# Patient Record
(demographics unavailable — no encounter records)

---

## 2017-11-03 NOTE — NUR
MADE DR MORENO AWARE OF PATIENT'S BP /128. DR MORENO CALLED DR RODGERS AND INFORMED HER ABOUT PATIENT'S VITAL SIGNS AND HX. PATIENT OK TO BE 
ADMITTED

## 2017-11-03 NOTE — NUR
Patient will be admitted to care of DR RODRIGUEZ. Admited to [facility].  Will go to 
room 113. Belongings list completed.  Report to BJ CAICEDO.

## 2017-11-03 NOTE — NUR
ADMINISTERED SCHEDULED MEDICATIONS. PT BP /134 . PT HAS PRN NITROGLYCERIN 0.4 MG 
SL. WILL ADMINISTER. PT DENIES CHEST PAIN AND HEADACHE. WILL CONTINUE TO MONITOR.

## 2017-11-03 NOTE — NUR
PT BP /98 HR 89. PT DENIES CHEST PAIN AND SOB. PT IS AAOX4 AND SHOWS NO S/S OF ACUTE 
DISTRESS ON ROOM AIR. PT IS SITTING UP AND EATING DINNER TOLERATING DIET WELL. BED IN LOW 
POSITION WITH CALL LIGHT WITHIN REACH.

## 2017-11-03 NOTE — NUR
PATIENT HAS BEEN SCREENED AND CATEGORIZED AS MODERATE NUTRITION RISK. PATIENT WILL BE SEEN 
WITHIN 3-5 DAYS OF ADMISSION.



11/5/17-11/7/17



DEE CORREA RD

## 2017-11-03 NOTE — NUR
PT HAS ASTHMA, C/O SHALLOW BREATHING WITH AUDIBLE WHEEZING SOUNDS.  INHALER AT 
HOME INEFFECTIVE. 

PT DENIES N/V/D; AAOX4 WITH EVEN AND STEADY GAIT; HR EVEN AND REGULAR; PT 
DENIES ANY FEVER, CP AT THIS TIME; PATIENT STATES PAIN OF 8/10 AT THIS TIME; 
VSS; PATIENT POSITIONED FOR COMFORT; HOB ELEVATED; BEDRAILS UP X2; BED DOWN. ER 
MD MADE AWARE OF PT STATUS.

## 2017-11-03 NOTE — NUR
PT ARRIVED ON UNIT. PT IS AAOX4 AND SHOWS NO S/S OF ACUTE DISTRESS ON ROOM AIR. PT BP IS 
187/116 . ER NURSE STATES DR RODGERS AND DR WEILE ARE AWARE OF HIGH BLOOD PRESSURE. 
SKIN IS INTACT. PT DENIES PAIN. IV NOTED ON THE R AC WITH IVF'S INFUSING WELL. ON TELE 
MONITOR. PT IS AMB. PT WAS EXPLAINED POC FOR TODAY AND VERBALIZED UNDERSTANDING. WILL 
CONTINUE TO MONITOR.

## 2017-11-03 NOTE — NUR
RECEIVED REPORT FROM DAY NURSE, PT IN STABLE CONDITION. NO S/S OF DISTRESS NOTED. PT WARM 
AND DRY. AAOX4, ON RA. IV TO R AC 20G, INFUSING WELL, PATENT AND INTACT. SKIN INTACT. 
RESPIRATIONS ARE EVEN AND UNLABORED. BOWEL SOUNDS PRESENT. INITIAL ASSESSMENT COMPLETED, 
PLAN OF CARE DISCUSSED WITH PT AT THE BEDSIDE, VERBALIZED UNDERSTANDING. ALL SAFETY 
PRECAUTIONS MET, CALL LIGHT WITHIN REACH, WILL CONTINUE TO MONITOR

## 2017-11-03 NOTE — NUR
DR WEILE STATED HE PLACED IN ORDERS FOR PT HIGH BLOOD PRESSURE. WILL ADMINISTER WHEN 
MEDICATIONS ARE READY.

## 2017-11-03 NOTE — NUR
PT IS SLEEPING AND SHOWS NO S/S OF ACUTE DISTRESS ON ROOM AIR. THE BED IS IN LOW POSITION 
WITH CALL LIGHT WITHIN REACH. IV SL.

## 2017-11-04 NOTE — NUR
PATIENT LYING IN BED COMFORTABLY. NO DISTRESS. ALL NEEDS MET AT THIS TIME. CALL LIGHT WITHIN 
REACH.

## 2017-11-04 NOTE — NUR
PT RESTING COMFORTABLY IN BED, NO S/S OF DISTRESS NOTED. ALL SAFETY PRECAUTIONS MET, CALL 
LIGHT WITHIN REACH, WILL CONTINUE TO MONITOR

## 2017-11-04 NOTE — NUR
RECEIVED PT IN BED. AWAKE. EATING BREAKFAST. ALERT ORIENTED X4. NO SOB NOTED. DENIES ANY 
PAIN OR DISCOMFORT AT THIS TIME. PT AMBULATORY. SAFETY PRECAUTION IN PLACE. CALL LIGHT 
WITHIN REACH.

## 2017-11-04 NOTE — NUR
PT KEPT CLEAN, DRY AND COMFORTABLE, NEEDS ATTENDED. WILL ENDORSE TO NEXT SHIFT, PT ON STABLE 
CONDITION, FOR CONTINUITY OF CARE. NO SOB NOTED, DENIES ANY PAIN OR DISCOMFORT AT THIS TIME.

## 2017-11-04 NOTE — NUR
GAVE REPORT TO DAY NURSE AT THE BEDSIDE FOR CONTINUITY OF CARE. PT REMAINS STABLE. NO S/S OF 
DISTRESS NOTED.

## 2017-11-04 NOTE — NUR
RECEIVED A CALL FROM LEXI FROM LAB REGARDING TROPONIN ORDER, NO ORDER FOR TROPONIN #2 AT 
11/3/17 AT 2300 AND 11/4/17 AT 0700. DR. BRIDGES MADE AWARE AND TO PUT IN ORDER.

## 2017-11-04 NOTE — NUR
RECEIVED REPORT FROM DAY SHIFT NURSE. AAOX4. NO C/O PAIN. NO SOB NOTED. IV TO RIGHT AC, 
SALINE LOCK. 

CALL LIGHT WITHIN REACH. WILL CONTINUE TO MONITOR.

## 2017-11-05 NOTE — NUR
RECEIVED REPORT FROM DAY RN. PATIENT RESTING IN BED, NO S/S OF ACUTE DISTRESS NOTED, 
RESPIRATION EVEN AND UNLABORED, IV PATENT AND INTACT. PLAN OF CARE DISCUSSED, PATIENT 
VERBALIZED UNDERSTANDING. CALL LIGHT WITHIN REACH, SAFETY MEASURE ENSURED, WILL CONTINUE TO 
MONITOR.

## 2017-11-05 NOTE — NUR
PATIENT IS WATCHING TV. NO S/S OF ACUTE DISTRESS NOTED, RESPIRATION EVEN AND UNLABORED, CALL 
LIGHT WITHIN REACH, SAFETY MEASURE ENSURED, WILL CONTINUE TO MONITOR.

## 2017-11-05 NOTE — NUR
RECHECKED BLOOD PRESSURE, RECORDED. BLOOD PRESSURE WENT DOWN. PT ASYMPTOMATIC. AWAKE, ABLE 
TO MAKE NEEDS KNOWN. WILL MONITOR VITAL SIGNS.

## 2017-11-05 NOTE — NUR
DUE MEDICATION GIVEN, PATIENT TOLERATED WELL. NO S/S OF ACUTE DISTRESS NOTED, RESPIRATION 
EVEN AND UNLABORED, CALL LIGHT WITHIN REACH, SAFETY MEASURE ENSURED, WILL CONTINUE TO 
MONITOR.

## 2017-11-05 NOTE — NUR
VITAL SIGNS TAKEN AND RECORDED. BLOOD PRESSURE NOTED TO BE ELEVATED. WILL MEDICATE WITH BP 
MEDS AS SCHEDULED.

## 2017-11-05 NOTE — NUR
PT KEPT CLEAN, DRY, AND COMFORTABLE, NEEDS ATTENDED. NO SOB NOTED. DENIES ANY PAIN OR 
DISCOMFORT AT THIS TIME.  WILL ENDORSE TO NEXT SHIFT, PT ON STABLE CONDITION, FOR CONTINUITY 
OF CARE.

## 2017-11-05 NOTE — NUR
EDUCATED PATIENT ON LOW SODIUM DIET DUE TO HEART CONDITION. PATIENT VERBALIZED 
UNDERSTANDING. SAFETY MEASURE ENSURED, WILL CONTINUE TO MONITOR.

## 2017-11-05 NOTE — NUR
RECEIVED PT IN BED. AWAKE. ALERT ORIENTEDX4. NO SOB NOTED. DENIES ANY PAIN OR DISCOMFORT AT 
THIS TIME. PT AMBULATORY. SAFETY PRECAUTION IN PLACE. CALL LIGHT WITHIN REACH.

## 2017-11-06 NOTE — NUR
GAVE D/C INSTRUCTIONS AND FORMS TO PT. PT'S DAUGHTER IS AT BEDSIDE AND TRANSLATED 
INSTRUCTIONS, F/U APPOINTMENT, LABS AND RX FOR PT. ASKED IF PT HAD ANY OTHER QUESTIONS, PT 
STATED NO. PT SIGNED FORMS. IV CATHETER REMOVED FROM LEFT HAND 22G. IV CATHETER TIP INTACT. 
APPLIED DRESSING AND PRESSURE TO SITE. NO BLEEDING NOTED. REMOVED TELE MONITOR AND ID BANDS. 
PT STATED SHE DID NOT HAVE A T-SHIRT. CALLED SECURITY AND DELIVERED T-SHIRT FOR PT TO GO 
HOME IN. PT CHANGED IN OWN CLOTHES AND LEFT WITH ALL PERSONAL BELONGINGS. LEFT UNIT VIA 
AMBULATION ACCOMPANIED BY RN AND DAUGHTER. LEFT IN STABLE CONDITION TO GO HOME.

## 2017-11-06 NOTE — NUR
PT'S BP /110. ADMINISTERED NITROSTAT SL FOR BP. PT TOLERATED MEDS WELL. PT IS SITTING 
UP IN BED. DENIES PAIN. NO RESPIRATORY DISTRESS NOTED. BED IN LOW POSITION, WHEELS LOCKED. 
INSTRUCTED PT TO USE CALL LIGHT WHEN NEED OF ASSISTANCE AND PLACE CALL LIGHT WITHIN REACH. 
PT VERBALIZED UNDERSTANDING. WILL CONTINUE TO MONITOR.

## 2017-11-06 NOTE — NUR
RECEIVED REPORT FROM NIGHT SHIFT NURSE JESUS AT BEDSIDE FOR CONTINUITY OF CARE. PT IS AWAKE 
AND ORIENTED. INTRODUCED SELF AND UPDATED BOARD. PT IN STABLE CONDITION.

## 2017-11-06 NOTE — NUR
/98, HR 96. PATIENT DENIES ANY DISCOMFORT OR PAIN. CALL LIGHT WITHIN REACH, SAFETY 
MEASURE ENSURED, WILL CONTINUE TO MONITOR.

## 2017-11-06 NOTE — NUR
OFFERED PATIENT HOT TEA AS SHE REQUESTED. NO CHANGE IN CONDITION, RESPIRATION EVEN AND 
UNLABORED, CALL LIGHT WITHIN REACH, SAFETY MEASURE ENSURED, WILL CONTINUE TO MONITOR.

## 2017-11-06 NOTE — NUR
MADE DR. ALATORRE AWARE OF PATIENT'S /98, NO NEW ORDER RECEIVED AT THIS TIME. WILL 
CONTINUE TO MONITOR.

## 2018-04-02 NOTE — NUR
52 Y/O F W/C/O SOB x 2 wks GETTING worse last 3 days, LABORED BREATHING NOTED, 
WHEEZES BILATERAL, AND RONCHI. PRODUCTIVE COUGH NOTED, DIAPHORECTIC, DENIES 
CHEST PAIN, SINUS TACHY ON MONITOR. MED HX HTN. PT PLACED ON MONITOR. ER MD 
,MADE AWARE.

hx--asthma, cardiomegaly, htn, dm

rx---albuterol, lisinopril

## 2018-04-02 NOTE — NUR
PT RESTING IN BED, LABORED CREATHING NOTED. DIAPHORETIC, SINUS TACHY, DENIES 
ANY CHEST PAIN. ER MD INFORMED.

## 2018-04-03 NOTE — NUR
RECEIVED REPORT FROM NIGHT SHIFT RN. PATIENT IS ASLEEP, BUT AROUSABLE TO SHAKING AND NAME. 
NO SIGNS AND SYMPTOMS OF ACUTE DISTRESS NOTED AT THIS TIME. HAS NC AT 3 LPM. PATIENT HAS A 
FAIR CATHETER DRAINING TO GRAVITY. HAS IV TO THE RIGHT AC 20G ON SALINE LOCK AT THIS TIME. 
SITE IS CLEAN, DRY, PATENT AND INTACT. BED IS IN LOWEST POSITION, SIDE RAILS UP X2, CALL 
LIGHT WITHIN REACH. WILL CONTINUE TO MONITOR.

## 2018-04-03 NOTE — NUR
RECEIVED BEDSIDE REPORT FROM DAY SHIFT NURSE RUBINA RN, PT STABLE, NO DISTRESS NOTED, CALL 
LIGHT WITHIN REACH, IV TO RAC 20G RUNNING ROCEPHINE @ 100 ML/HR, PT ON 2LPM O2 VIA NC NO 
SOB, FAIR IN PLACE DRAINING YELLOW URINE, INITIAL ASSESSMENT DONE, ALL SAFETY PRECAUTION 
MET, WILL CONTINUE TO MONITOR.

## 2018-04-03 NOTE — NUR
PT C/O FEELING HUNGRY AND WANTS COFFEE, SANDWICH AND DECAF COFFEE GIVEN, PT ATE WELL, AFTER 
EATING PT WENT BACK TO SLEEP, NO DISTRESS  NOTED, CALL LIGHT WITHIN REACH, WILL CONTINUE TO 
MONITOR.

## 2018-04-03 NOTE — NUR
PT ARRIVED AT UNIT VIA GURNEY, REPORT RECEIVED FROM ED NURSE BUDDY RN, PT STABLE, NO DISTRESS 
NOTED, IV TO R AC 20G SL, PATENT, INTACT, PT ON 3LPM O2 VIA NC, NO SOB, SKIN INTACT ORIENT 
PT TO UNIT AND CALL LIGHT, PT STATED UNDERSTANDING, MRSA SWAB TAKEN, INITIAL ASSESSMENT 
DONE, ALL SAFETY PRECAUTION MET, WILL CONTINUE TO MONITOR.

## 2018-04-03 NOTE — NUR
FOUND PT ON 2L NC. TX WAS TOLERATED AND GIVEN VIA MASK. PT IS NOT IN ANY RESPIRATORY 
DISTRESS OR SOB. SP02 98%  RR 16. PLACED PT BACK ON 2L NC.

## 2018-04-03 NOTE — NUR
PATIENT HAS BEEN SCREENED AND CATEGORIZED AS HIGH NUTRITION RISK. PATIENT WILL BE SEEN 
WITHIN 1-2 DAYS OF ADMISSION.



4/3/18 - 4/4/18



DEE CORREA RD

## 2018-04-03 NOTE — NUR
ENDORSED PLAN OF CARE TO DAY SHIFT NURSE RUBINA RN, PT STABLE, NO DISTRESS NOTED, CALL 
LIGHT WITHIN REACH.

## 2018-04-03 NOTE — NUR
PT RESTING IN BED, STATES HAS NO MORE PAIN AT THE MOMENT. CONTINUES SINUS 
TACHY, IN O2, 99% SPO2. DENIES ANY CHEST PAIN. URINE OUTPUT 1400 ML AT THE 
MOMENT. NO OTHER S/S OF DISTRESS NOTED AT THE MOMENT. ER MD MOISE AWARE.

## 2018-04-03 NOTE — NUR
DUE MEDICATION GIVEN, PT TOLERATED WELL, NO DISTRESS NOTED, CALL LIGHT WITHIN REACH, WILL 
CONTINUE TO MONITOR.

## 2018-04-03 NOTE — NUR
Patient will be admitted to care of DR MAGALLON. Admited to TELEMETRY.  Will 
go to room 119A. Belongings list completed.  Report to BJ MCGRATH.

## 2018-04-04 NOTE — NUR
RECEIVED REPORT FROM NIGHT SHIFT RN. PATIENT IS AAOX4. NO SIGNS AND SYMPTOMS OF ACUTE 
DISTRESS NOTED AT THIS TIME. PATIENT IS ON ROOM AIR. PATIENT HAS A FAIR CATHETER DRAINING 
TO GRAVITY. HAS IV TO THE RIGHT AC 20G ON SALINE LOCK AT THIS TIME. SITE IS CLEAN, DRY, 
PATENT AND INTACT. BED IS IN LOWEST POSITION, SIDE RAILS UP X2, CALL LIGHT WITHIN REACH. 
WILL CONTINUE TO MONITOR.

## 2018-04-04 NOTE — NUR
SCHEDULED MEDICATIONS GIVEN. PT TOLERATED WELL. NO S/S OF RESPIRATORY DISTRESS OR DISCOMFORT 
NOTED AT THIS TIME. PT RESTING IN BED WATCHING TV. BED IN LOWEST POSITION. CALL LIGHT WITHIN 
REACH. WILL CONTINUE TO MONITOR.

## 2018-04-04 NOTE — NUR
CHECKED ON PT, PT TOOK OFF NC, PT ON ROOM AIR, NO SOB, CHECKED PT O2 SAT 95%, PT STATED DOES 
NOT WANT TO WEAR THE NC AT THIS MOMENT, PT WENT BACK TO SLEEP, NO DISTRESS NOTED, CALL LIGHT 
WITHIN REACH, WILL CONTINUE TO MONITOR.

## 2018-04-04 NOTE — NUR
PT C/O HEADACHE, TYLENOL GIVEN, PT TOLERATED WELL, NO DISTRESS NOTED, CALL LIGHT WITHIN 
REACH, WILL CONTINUE TO MONITOR.

## 2018-04-04 NOTE — NUR
PT SLEEPING IN BED. NO S/S OF RESPIRATORY DISTRESS OR DISCOMFORT NOTED AT THIS TIME. CALL 
LIGHT WITHIN REACH. WILL CONTINUE TO MONITOR.

## 2018-04-04 NOTE — NUR
RECEIVED PT FROM DAY SHIFT NURSE ALMA. AOX4 ON ROOM AIR. IV RIGHT WRIST #22G, SALINE 
LOCK, PATENT AND FLUSHING WELL. PT RESTING IN BED. FAMILY AT BEDSIDE. PT WATCHING TV. NO S/S 
OF RESPIRATORY DISTRESS OR DISCOMFORT NOTED AT THIS TIME. BED IN LOWEST POSITION. CALL LIGHT 
WITHIN REACH. WILL CONTINUE TO MONITOR.

## 2018-04-05 NOTE — NUR
ADMINISTERED SCHEDULED MEDICATIONS, PATIENT ALSO C/O HEADACHE AND WAS GIVEN TYLENOL 650 MG 
PO. WILL REASSESS PAIN IN ONE HR.

## 2018-04-05 NOTE — NUR
***** PHYSICAL THERAPY CO-SIGN *****

The Physical Therapy Progress Notes documented by Physical Therapy Assistant have been 
reviewed.

I CONCUR W/PTA NOTE

Reviewed/Co-Signed by: Roseann Araujo PT

Documentation Done by: JACINDA LEOS PTA

-------------------------------------------------------------------------------

Addendum: 04/05/18 at 1337 by Roseann Araujo PT

-------------------------------------------------------------------------------

Amended: Links added.

## 2018-04-05 NOTE — NUR
RECEIVED PATIENT REPORT AT BEDSIDE FROM NIGHT NURSE, PATIENT IS AAOX4 AND SHOWS NO S/S OF 
ACUTE DISTRESS ON ROOM AIR, SKIN IS INTACT; HOWEVER PATIENT HAS ROUGH DRY SKIN AT BLE, IV 
NOTED ON THER LEFT WRIST SL PATENT AND INTACT. FAIR CATHETER NOTED WITH YELLOW URINE 
DRAINING, PATIENT DENIES PAIN, DISCUSSED POC WITH PATIENT AND SHE VERBALIZED UNDERSTANDING 
OF CARE. BED IN LOW POSITION WITH CALL LIGHT WITHIN REACH.

## 2018-04-05 NOTE — NUR
PATIENT HAS BEEN DISCHARGED, ALL PAPERWORK SIGNED, DISCHARGE INSTRUCTIONS GIVEN AND 
PRESCRIPTIONS, ALL QUESTIONS ANSWERED, ALL BELONGINGS AND PRESCRIPTIONS IN PATIENT'S 
POSSESSION, IV WAS DISCONTINUED WITH CANNULA INTACT. PATIENT VERBALIZED UNDERSTANDING OF HER 
CONTINUITY OF CARE.  WRISTBANDS REMOVED. PATIENT WAS OFFERED WHEELCHAIR HOWEVER SHE 
PREFERRED TO AMB OFF UNIT. PATIENT AMB OFF UNIT WITH STEADY GAIT.

## 2018-04-05 NOTE — NUR
PATIENT'S BLOOD SUGAR , INSULIN SLIDING SCALE IS INDICATED; HOWEVER PATIENT REFUSED 
AND STATED, "NO LO USO EN LA CASA, ME VAN A SANDEE LORAINE HORITA. NO LO QUIERO." PATIENT REFUSED 
TO TAKE INDICATED INSULIN SLIDING SCALE PATIENT STATED SHE DOES NOT USE IT AT HOME, AND SHE 
WILL CONTINUE HER HOME MEDICATIONS ONCE SHE IS DISCHARGED TODAY.

## 2018-04-07 NOTE — NUR
52 yo female [facility] pd for prebook exam. c/o  SORE THROAT , COUGH & sob 
and hx of asthma and chf & HTN . awake and alert able to ambulate. PT STATED 
CAME HERE WITH  SAME S/S X 2 DAYS AGO  BUT DIDN'T TAKE  DISCHARGE MEDS.   
BRUISE TO RIGHT UPPER ARM. AAOX4 WITH EVEN AND STEADY GAIT; LUNGS WHEEZING  BL. 
PT DENIES ANY FEVER OR CP AT THIS TIME.  PATIENT STATES PAIN OF 2/10 AT THIS 
TIME

## 2018-09-20 NOTE — NUR
CHECKED ON PT. ADMINISTERED BP MEDS AS SCHEDULED. STARTED ROCEPHIN ON PT. TOLERATED WELL. 
INFORMED THAT PT IS ON FLUID RESTRICTION 1000ML/DAY R/T HER DX.  VERBALIZED UNDERSTANDING. 
FAMILY AT BEDSIDE. NO SIGN OF DISTRESS NOTED . RESPIRATION RATE 20, NO DISTRESS. WILL 
CONTINUE TO MONITOR PT.

## 2018-09-20 NOTE — NUR
CHECKED ON PT. FAMILY AT THE BEDSIDE. INFORMED PT AND THE FAMILY THAT PT IS ON FLUID 
RESTRICTION 1000 ML/DAY. VERBALIZED UNDERSTANDING OF TEACHING. PT EATING HER DINNER. O2 SAT 
96% ON RA. NO SIGN OF DISTRESS NOTED. WILL CONTINUE TO MONITOR PT.

## 2018-09-20 NOTE — NUR
52 Y.O MASOUD COLLINS CAME IN FOR SOB X2 DAYS. PT IS UNABLE TO CATCH HER 
BREATH AND COMMUNICATE. PT HAS LABORED BREATHING, ACCESSORY MUSCLE USE, STRIDOR 
AUDIBLE. WHEEZING PRESENT IN UPPER LOBES AND RIGHT MIDDLE, DIMINISHED IN BOTH 
LOWER LOBES. BILATRALLY JVD PRESENT. PLACED PT ON 2L NC; SPO2 99%. PT HAS A 
DRY, NONPRODUCTIVE COUGH. SHE IS DIAPHORETIC. SKIN ON LOWER LIMBS IS COOL TO 
THE TOUCH AND PT STATES THE HER HANDS AND FEET ARE ALWAYS COLD. PT STATES THAT 
NOTHING IS HELPING TO MAKE HER BREATHING EASIER. S1S2 PRESENT, TACHYCARDIA (HR 
118) ON THE MONITOR. BP /104. 



MEDICAL HISTORY INCLUDES: 

CHF

ASTHMA

HTN

## 2018-09-20 NOTE — NUR
RECEIVED REPORT FROM ER NURSE AT BEDSIDE. PT DX CHF, CC SOB. HX OF ASTHMA, HTN, DM , CHF.PT 
AMBULATORY , SKIN INTACT. VS NOTED O2 98% ON RA, HAS MILD LABORED BREATHING, WHEEZING LUNG 
SOUND ON RT LOBE, DIMINISHED LUNG SOUND ON LFT LOBE. , COMPLAINING OF CHEST PAIN ON 
HER LFT SIDE. T 97.8, RR 24. RAISED THE HOB, PT STAES FELT COMFORTABLE. HAS IV ACCESS ON LFT 
AC 20G. CONTINUED MONITORING PT O2 SAT. WILL CONTINUE TO MONITOR PT.

## 2018-09-20 NOTE — NUR
ASSISTED PT WITH ORAL CARE & TO CHANGE HOSPITAL GOWN. PT ABLE TO PERFORM ADL TASKS WITH GOOD 
SAFETY TECHNIQUES. DENIES ANY DISCOMFORT. ENCOURAGED TO USE CALL STAFF FOR ASSISTANCE PRN. 
PT ABLE TO RETURN DEMONSTRATE PROPER USE OF CALL BUTTON.

## 2018-09-20 NOTE — NUR
RECEIVED REPORT FROM AM SHIFT NURSE AT PT'S BEDSIDE. PT SMILING, AWAKE & VERBAL, NO SIGNS OF 
DISTRESS. CALL LIGHT WITHIN REACH.

## 2018-09-20 NOTE — NUR
DUE MEDS GIVEN AT THIS TIME. PT ALERT & AWAKE, DENIES ANY DISCOMFORT, NO RESPIRATORY 
DISTRESS IN ROOM AIR. REINFORCED FLUID RESTRICTIONS. PT VERBALIZED UNDERSTANDING. CALL LIGHT 
WITHIN REACH. WILL CONTINUE TO MONITOR.

## 2018-09-21 NOTE — NUR
PT ASLEEP, AROUSABLE BY VOICE. VITAL SIGNS OBTAINED. PT RR 22/MIN NON-LABORED. PT DENIES ANY 
DISCOMFORT. PT WENT BACK TO SLEEP WITH HOB @ 30DEG. CALL LIGHT WITHIN REACH. LEFT AC IV 
TRANSPARENT DRESSING PEELING OFF. DRESSING CHANGED. IV PATENT WITH GOOD BACKFLOW, FLUSHED 
WITH 5ML NS.

## 2018-09-21 NOTE — NUR
REVIEWED PATIENT ASSESSMENT AND DIAGNOSTIC FINDINGS WITH DR AVTAR DASH MD TO ADD FREQUENCY 
TO Q6WAR AND OXYGEN SATURATION GREATER THAN 92%

## 2018-09-21 NOTE — NUR
RECEIVED PATIENT AWAKE RESTING ON BED. BED IN LOW LOCKED POSITION. DISCUSSED PLAN OF CARE 
AND VERBALIZED UNDERSTANDING. CALL LIGHT WITHIN REACH. WILL CONTINUE TO MONITOR.

## 2018-09-21 NOTE — NUR
PT ASLEEP, RESPIRATIONS EVEN & UNLABORED, AROUSABLE BY AUDITORY STIMULI. LEFT AC IV ACCESS 
INTACT & ASYMPTOMATIC WITH ONGOING NS INFUSION @ 10ML/HR. CALL LIGHT WITHIN REACH.

## 2018-09-21 NOTE — NUR
V/S TAKEN AND RECORDED. BS TAKEN  . INSULIN GIVEN PER SLIDING SCALE. MEDICATION GIVEN 
AND TOLERATED WELL. NO S/S OF DISTRESS NOTED. WILL CONTINUE TO MONITOR.

## 2018-09-21 NOTE — NUR
ADMINISTERED ANTIBIOTICS. PATIENT TOLERATED WELL. IV IS CLEAN, DRY AND INTACT. SHE IS 
CURRENTLY ON THE PHONE. NO SIGNS OF DISTRESS. WILL CONTINUE TO MONITOR THE PATIENT

## 2018-09-21 NOTE — NUR
PT ASLEEP IN BED WITH HOB @ ABOUT 30DEG. RESPIRATIONS EVEN & UNLABORED. CALL LIGHT WITHIN 
REACH. LEFT AC IV ACCESS INTACT & ASYMPTOMATIC.

## 2018-09-21 NOTE — NUR
PT ASLEEP, AROUSABLE BY VOICE. VITAL SIGNS OBTAINED. PT DENIES ANY DISCOMFORT. RESPIRATIONS 
DEEP, BUT EVEN & UNLABORED. NO RESP DISTRESS IN ROOM AIR. OFFERED TO ASSIST PT TO TOILET, 
DENIES NEED AT THIS TIME. LEFT AC IV INTACT & ASYMPTOMATIC.

## 2018-09-21 NOTE — NUR
PATIENT LAYING IN BED. NO RESP DISTRESS. SHE SAID SHE FEELS BETTER AFTER THE BREATHING TX RT 
GAVE HER. WILL CONTINUE TO MONITOR THE PATIENT

## 2018-09-21 NOTE — NUR
PATIENT HAS BEEN SCREENED AND CATEGORIZED AS MODERATE NUTRITION RISK. PATIENT WILL BE SEEN 
WITHIN 3-5 DAYS OF ADMISSION.



9/22/18 9/24/18



GREG CATALAN RD

## 2018-09-21 NOTE — NUR
RECEIVED BEDSIDE REPORT FROM NIGHT SHIFT NURSE. PATIENT IS SLEEPING. NO SIGNS OF DISTRESS. 
PATIENT IS AMBULATORY. SKIN IS INTACT. TELE MONITOR IN PLACE. L AC 20G INFUSING NS AT 10 
TKO. BED IN LOW POSITION. CALL LIGHT WITHIN REACH. WILL CONTINUE TO MONITOR THE PATIENT.

## 2018-09-21 NOTE — NUR
PT AWAKE, SITTING UP @ EDGE OF BED. PT REQUESTS FOR SNACK. [employer] SANDWICH PROVIDED. 
DENIES ANY PAIN OR DISCOMFORT. RESPIRATIONS EVEN & UNLABORED. PT CONSUMED 100% OF SNACK WITH 
240ML OF WATER. PT LAID BACK DOWN TO BED. CALL LIGHT PLACED WITHIN REACH. DENIES NEED TO USE 
RESTROOM AT THIS TIME. WILL CONTINUE TO MONITOR.

## 2018-09-21 NOTE — NUR
ADMINISTERED MEDS. PATIENT TOLERATED WELL. PATIENT FEELS SOB, PAGED RT TO GIVE HER A 
BREATHING TX, RT TO COME AND SEE THE PATIENT. WILL CONTINUE TO MONITOR THE PATIENT

## 2018-09-21 NOTE — NUR
PATIENT IS EATING LUNCH. NO SIGNS OF DISTRESS ON ROOM AIR. WILL CONTINUE TO MONITOR THE 
PATIENT. EXPLAINED TO THE PATIENT I HAD TO REMOVE PITCHER OF WATER. SHE WANTS TO DRINK JUICE 
AND SHE HAS 1L FLUID RESTRICTIONS. PATIENT ALREADY AT AROUND 700ML OF FLUIDS FOR THE DAY

## 2018-09-22 NOTE — NUR
ADMINISTERED MEDS TO PT AS ORDERED. TOLERATED WELL. NO SIGN OF DISTRESS. PT WAS SLEEPING , 
IVF INFUSING WELL. @ 10 ML/HR. WILL CONTINUE TO MONITOR PT.

## 2018-09-22 NOTE — NUR
RECEIVED A CALL FROM FAMILIA OF BLOOD BANK STATED THAT PT'S SPUTUM SPECIMEN IS CONTAMINATED 
WITH SALIVA AND NEEDS A RECOLLECTION, MADE AWARE THAT PT WAS DISCHARGED TODAY AT 1330.

## 2018-09-22 NOTE — NUR
PT DC. USED THE  SERVICE TO MAKE PT UNDERSTAND HER DC INSTRUCTION. ID # [id number], 
NAME OF  DONAVAN. PT VERBALIZED UNDERSTANDING OF HER DC INSTRUCTION. INSTRUCTED HER 
TO COMPLETE AZITHROMYCIN AS ORDER BY MD AND ALSO TAKE HOME MEDS AS SHE USE TO TAKE AT HOME. 
PT VERBALIZED UNDERSTANDING, STATES TO HAVE NO QUESTION. PT WENT HOME WITH HER DC PACKET AND 
ALL HER BELONGINGS. PT STABLE AT THE TIME OF DISCHARGE. WALKED OUT OF HOSPITAL ON HER OWN.

## 2018-09-22 NOTE — NUR
CHECKED ON PT. INFORMED HER OF HER DC ORDER. WILL WORK ON DC PAPER. PT STATES THAT SHE HAVE 
NO ONE PICK HER UP AT THIS TIME. EVERYONE IN FAMILY IS WORKING. DAUGHTER IN ICU. INFORMED 
THAT HOSPITAL WILL PROVIDE BUS-PASS . STATES WILL LEAVE BY PM AROUND 1900 IF SOMEONE WILL 
FAMILY WILL COME TO PICK HER UP. CHARGE NURSE AWARE. WILL TALK TO PT.

## 2018-09-22 NOTE — NUR
ENDORSEMENT GIVEN TO AM SHIFT NURSE AT BEDSIDE FOR CONTINUITY OF CARE. PATIENT IN STABLE 
CONDITION.

## 2018-09-22 NOTE — NUR
V/S TAKEN AND RECORDED. NO S/S OF DISTRESS NOTED. CALL LIGHT WITHIN REACH. WILL CONTINUE TO 
MONITOR.

## 2018-09-22 NOTE — NUR
RECEIVED REPORT FROM PM NURSE AT BEDSIDE. PT SLEEPING. NO SOB. HAS IV ON LFT AC 20 G, IFF 
INFUSING AT 10 ML /HR. ON FLUID RESTRICTION 1L/DAY. SPUTUM COLLECTED AND SENT TO LAB FOR 
CULTURE. PT ON BERATING TREATMENT. PT AOX4, AMBULATORY. NO SIGN OF DISTRESS NOTED. PLACED 
CALL LIGHT WITHIN PT REACH. INFORMED HER TO USE CALL LIGHT FOR ANY HELP. BED AT LOWER 
POSITION. PT Israeli SPEAKING ONLY. WILL CONTINUE TO MONITOR PT.

## 2018-09-22 NOTE — NUR
V/S TAKEN AND RECORDED. PATIENT SEEN LYING ON BED. CALL LIGHT WITHIN REACH. NO S/S OF 
DISTRESS NOTED AT THIS TIME.